# Patient Record
Sex: FEMALE | Race: WHITE
[De-identification: names, ages, dates, MRNs, and addresses within clinical notes are randomized per-mention and may not be internally consistent; named-entity substitution may affect disease eponyms.]

---

## 2020-02-25 NOTE — HP
*** AMENDED REPORT NOW INCLUDES DESIGNATED COSIGNER - ESIGNED BEFORE 
ADJUSTMENTS ***



HISTORY AND PHYSICAL:

 

DATE OF ADMISSION/SURGERY:  03/03/20

 

DATE OF OFFICE VISIT:  02/24/20

 

SURGEON:  Claudia Simon MD * (COLT BURNETTE)

 

PROCEDURE:  Left total knee arthroplasty.

 

CHIEF COMPLAINT:  Left knee pain.

 

HISTORY OF PRESENT ILLNESS:  Ms. Greenfield is a 69-year-old female with endstage 
osteoarthritis of the left knee.  She has failed conservative treatment and 
elected to proceed with the left total knee arthroplasty.

 

PAST MEDICAL HISTORY:  History of breast cancer.

 

PAST SURGICAL HISTORY:  Lumpectomy bilaterally, cholecystectomy, bladder 
suspension, urethral sling and hysterectomy.

 

MEDICATIONS:  No medications.

 

ALLERGIES:  CODEINE.

 

FAMILY HISTORY:  Coronary artery disease, cancer, diabetes and stroke.

 

SOCIAL HISTORY:  She is a 69-year-old female.  She lives with her spouse.  She 
does not smoke.

 

REVIEW OF SYSTEMS:  A complete 14-point review of systems was reviewed with the 
patient and was all negative or noncontributory.  She denies history of DVT, PE
, hepatitis, HIV or anesthesia problems.

 

                               PHYSICAL EXAMINATION

 

GENERAL:  She is well-developed, well-nourished, in no acute distress.

 

VITAL SIGNS:  She weighs 163 pounds, blood pressure 138/62, heart rate 90.

 

HEENT:  Normocephalic, atraumatic.

 

NECK:  Supple.  No palpable lymph nodes.

 

PULMONARY:  Lungs are clear to auscultation bilaterally.

 

CARDIO:  Regular rate and rhythm.  Strong S1 and S2.

 

ABDOMEN:  Soft, nontender, nondistended.

 

NEUROLOGIC:  She is alert and oriented x3.

 

MUSCULOSKELETAL:  Left lower extremity, skin is intact.  There are no open 
wounds or abrasions.  There is varus deformity at the left knee with a moderate 
effusion. Range of motion is 10 to 125 degrees with flexion with patellofemoral 
crepitus. She has some tenderness along the medial joint line.  She is able to 
dorsiflex and plantarflex.  2+ dorsalis pedis pulses, intact sensation.

 

 ASSESSMENT AND PLAN:  Ms. Greenfield is a 69-year-old female with end stage 
osteoarthritis of the left knee.  She has failed conservative treatment and 
elected to proceed with left total knee arthroplasty.  Surgery is scheduled for 
03/03/20 with Dr. Simon.  Dr. Simon discussed the risks and benefits of the 
surgery at today's visit and all of her questions were answered.  She will 
follow up with Dr. Simon 2 weeks after the surgery.

 

 ____________________________________ COLT KNOTT

 

[id number]/[facility] #: [id number]

ROLA

## 2020-03-03 ENCOUNTER — HOSPITAL ENCOUNTER (OUTPATIENT)
Dept: HOSPITAL 25 - OR | Age: 70
Setting detail: OBSERVATION
LOS: 1 days | Discharge: HOME | End: 2020-03-04
Attending: ORTHOPAEDIC SURGERY | Admitting: PHYSICIAN ASSISTANT
Payer: MEDICARE

## 2020-03-03 DIAGNOSIS — Z88.5: ICD-10-CM

## 2020-03-03 DIAGNOSIS — Z82.49: ICD-10-CM

## 2020-03-03 DIAGNOSIS — Z85.3: ICD-10-CM

## 2020-03-03 DIAGNOSIS — I10: ICD-10-CM

## 2020-03-03 DIAGNOSIS — Z90.13: ICD-10-CM

## 2020-03-03 DIAGNOSIS — M17.12: Primary | ICD-10-CM

## 2020-03-03 DIAGNOSIS — Z83.3: ICD-10-CM

## 2020-03-03 PROCEDURE — 85049 AUTOMATED PLATELET COUNT: CPT

## 2020-03-03 PROCEDURE — 96366 THER/PROPH/DIAG IV INF ADDON: CPT

## 2020-03-03 PROCEDURE — 86900 BLOOD TYPING SEROLOGIC ABO: CPT

## 2020-03-03 PROCEDURE — 85018 HEMOGLOBIN: CPT

## 2020-03-03 PROCEDURE — 96365 THER/PROPH/DIAG IV INF INIT: CPT

## 2020-03-03 PROCEDURE — 96361 HYDRATE IV INFUSION ADD-ON: CPT

## 2020-03-03 PROCEDURE — 80048 BASIC METABOLIC PNL TOTAL CA: CPT

## 2020-03-03 PROCEDURE — G0378 HOSPITAL OBSERVATION PER HR: HCPCS

## 2020-03-03 PROCEDURE — 96376 TX/PRO/DX INJ SAME DRUG ADON: CPT

## 2020-03-03 PROCEDURE — 86901 BLOOD TYPING SEROLOGIC RH(D): CPT

## 2020-03-03 PROCEDURE — 36415 COLL VENOUS BLD VENIPUNCTURE: CPT

## 2020-03-03 PROCEDURE — 85014 HEMATOCRIT: CPT

## 2020-03-03 PROCEDURE — 86850 RBC ANTIBODY SCREEN: CPT

## 2020-03-03 PROCEDURE — 96375 TX/PRO/DX INJ NEW DRUG ADDON: CPT

## 2020-03-03 RX ADMIN — SODIUM CHLORIDE, SODIUM LACTATE, POTASSIUM CHLORIDE, AND CALCIUM CHLORIDE SCH MLS/HR: 600; 310; 30; 20 INJECTION, SOLUTION INTRAVENOUS at 21:44

## 2020-03-03 RX ADMIN — FENTANYL CITRATE PRN MCG: 0.05 INJECTION, SOLUTION INTRAMUSCULAR; INTRAVENOUS at 18:55

## 2020-03-03 RX ADMIN — FENTANYL CITRATE PRN MCG: 0.05 INJECTION, SOLUTION INTRAMUSCULAR; INTRAVENOUS at 19:59

## 2020-03-03 RX ADMIN — ACETAMINOPHEN SCH: 325 TABLET ORAL at 21:44

## 2020-03-03 RX ADMIN — OXYCODONE HYDROCHLORIDE AND ACETAMINOPHEN PRN TAB: 5; 325 TABLET ORAL at 21:43

## 2020-03-03 RX ADMIN — DOCUSATE SODIUM SCH MG: 100 CAPSULE, LIQUID FILLED ORAL at 21:42

## 2020-03-03 RX ADMIN — CEFAZOLIN SCH MLS/HR: 1 INJECTION, POWDER, FOR SOLUTION INTRAVENOUS at 22:46

## 2020-03-03 RX ADMIN — MAGNESIUM HYDROXIDE SCH ML: 400 SUSPENSION ORAL at 21:42

## 2020-03-03 NOTE — CONS
CC:  Dr. Claudia Simon; Lakshmi Hartman NP *

 

HOSPITAL MEDICINE CONSULTATION:

 

DATE OF CONSULT:  20

 

REQUESTING PROVIDER ON CONSULT:  Dr. Claudia Simon.

 

PRIMARY CARE PHYSICIAN:  Lakshmi Hartman NP.

 

ATTENDING PHYSICIAN:  Dr. Rosaline Germain.

 

REASON FOR CONSULT:  Postoperative hypertension.

 

HISTORY OF PRESENT ILLNESS:  Ms. Greenfield is a 69-year-old female with past 
medical history of breast cancer, status post lumpectomy and radiation, who 
presents to [facility] today for an elective left total knee arthroplasty.  The 
patient had surgery late today and in the postoperative period was noted to be 
hypertensive with readings up into the 180s/100s. The patient notes that blood 
pressure recently has been around 130/70 and this is very consistent for her.  
She does note that a few days prior to surgery, she did have an elevated blood 
pressure reading with systolic in the 180s, though this is the first time that 
she has seen an elevated reading.  Of note, the patient did have an abnormal 
urinalysis on 20, and was prescribed a 7-day course of Cipro which she 
has completed at this point.

 

The patient is seen in PACU remaining in the bed.  At this time she offers no 
complaints.  She denies any chest pain, shortness of breath, dizziness or 
headache. She denies any actual pain, but notes that she has some sensation of 
mild discomfort in her left knee.

 

PAST MEDICAL HISTORY:  

1.  Bilateral breast cancer, status post lumpectomy and radiation.

 

PAST MEDICAL HISTORY:

1.  Bilateral lumpectomy.

2.  Cholecystectomy.

3.  Urethral sling and hysterectomy.

4.  Bladder suspension.

 

HOME MEDICATIONS:  None.

 

ALLERGIES:  CODEINE.

 

FAMILY HISTORY:  The patient reports her father had a history of heart disease 
and CVA, though he ultimately  of brain cancer.  She reports a brother with 
significant heart disease, recently undergoing a bypass surgery.  He also has 
hypertension and diabetes.

 

SOCIAL HISTORY:  She lives at home with her .  She notes she typically 
is quite active and tries to maintain a healthy diet.  Denies any tobacco, 
alcohol or recreational drug use.  In 2019, she was drinking alcoholic drinks 
daily though has not had any alcohol since 20.

 

The patient's , Nicolas, will be her surrogate decision maker in the event 
she is unable to make her own decisions.

 

REVIEW OF SYSTEMS:  An 11-point review of systems was performed and all the 
pertinent positive and negative findings are in the HPI.  All other systems are 
negative.

 

PHYSICAL EXAM:  General:  Ms. Greenfield is a well-developed, well-nourished, 
middle- aged white female, lying in bed, in no acute distress.  She appears her 
stated age. Vital Signs:  Temp 98.1, heart rate 84, respiratory rate 20, oxygen 
saturation 98% on room air, blood pressure 165/104.  HEENT:  Head is atraumatic
, normocephalic. Visual fields are grossly intact.  Pupils are equal, round, 
and reactive to light and accommodation.  Respiratory:  Symmetrical chest 
expansion.  Lungs:  Clear to auscultation throughout.  No rhonchi, wheezes or 
rales.  Cardiovascular:  Regular rate and rhythm.  S1, S2 present.  No murmurs, 
rubs or gallops.  No JVD. Extremities:  Skin warm and smooth bilaterally.  No 
edema.  No clubbing or cyanosis.  Abdomen:  Soft, nontender to palpation.  
Bowel sounds normoactive. Neuro:  Awake, alert, and oriented x4.  Speech is 
clear and fluent.  Moves all extremities.  Skin:  There is a surgical dressing 
intact to the left knee.

 

DIAGNOSTIC STUDIES/LAB DATA:  No pertinent data.

 

ASSESSMENT AND PLAN:  Ms. Greenfield is a 69-year-old female with past medical 
history of breast cancer, who presents today for an elective left total knee 
arthroplasty and was noted to be hypertensive in the postoperative setting.  
The patient is being admitted by Ortho, and Hospital Medicine will consult for:

 

1.  Status post left total knee arthroplasty.  Management per Ortho.

2.  Hypertension.  The patient is significantly hypertensive in PACU, but does 
not have any history of hypertension.  I suspect that this is related to 
anxiety and that this is simply a stress response.  The patient has had normal 
blood pressures very recently.  She is asymptomatic at this time.  I have 
ordered hydralazine p.r.n. for systolic pressures over 170.  First dose will be 
given by the PACU nurse now.  At this point, I will avoid putting her on any 
oral antihypertensives as I do not anticipate that she will need anything going 
forward.  We will continue to monitor her blood pressure and make any changes 
if necessary.

2.  History of breast cancer:  In remission.

3.  DVT prophylaxis:  Eliquis per Ortho.

4.  Code status:  Full code.

 

____________________________________ 

AMELIA ELLIOTT, NP

 

[id number]/[facility] #: [id number]

[facility] Division[facility]IS ALBERTO

## 2020-03-03 NOTE — OP
Operative Report - Blank





- Operative Report


Date of Operation: 03/03/20


Note: 





EMELY FRYE


1950





Date of Surgery: 3/3/20





Claudia Simon MD





Assistant: MAXIMILIANO GREGORY did help throughout the procedure with preparation of 

the knee, wound retraction, manipulation of the knee, and wound closure.  





Anesthesiologist: Dr. Chaudhry





Anesthesia Type: Spinal





Preoperative Diagnosis: Left severe degenerative osteoarthritis of the knee





Postoperative Diagnosis: As above





Procedure Performed: Left Total Knee Arthroplasty





Tourniquet time: 44 minutes





Complications: None





Specimen: Bone and cartilage from the left knee joint sent to pathology.  





Hardware Used: Cemented Smith and Nephew total knee hardware was used - For the 

femur a size 4 left narrow legion posterior stabilized femoral component, for 

the tibia a size 3 left mohit II tibial baseplate, for the insert a size 11 

mm 3-4 posterior stabilized articular polyethylene insert, and for the patella 

a size 29 3-peg all poly patella.  





Brief History/Indication: EMELY FRYE was known in clinic and had a history 

of severe left knee pain and swelling. She failed conservative treatment with 

anti-inflammatories, pain pills, intra-articular injections and physical 

therapy.  She elected to undergo left total knee arthroplasty due to continued 

pain and decreased quality of life.  Radiographs showed severe end stage 

osteoarthritis of the knee with bone on bone contact.  Informed consent was 

obtained from the patient.  She understood the risks of surgery included but 

were not limited to: bleeding, infection, damage to nearby structures, 

intraoperative fracture, nerve palsy, failure of the hardware, early loosening, 

knee stiffness or loss of motion, anesthesia complications, stroke, heart attack

, blood clot and death.  She wished to proceed.





Intra-Operative Findings: Intraoperatively the patient was noted to have severe 

loss of cartilage in all 3 compartments of the knee.  





Description of the Procedure: 





EMELY FRYE was identified in the preanesthesia unit. Her left knee was 

marked as the correct operative side.  Informed consent was signed and placed 

in the chart. The patient was taken to the operating room and placed under 

anesthesia without complication. A gutierrez catheter was placed. A tourniquet was 

placed on the left thigh. The left lower extremity was prepped and draped in 

the usual sterile fashion. Preoperative time-out was made to correctly identify 

the patient, side and site. Appropriate intraoperative antibiotics were given 

within one hour of incision.





Tourniquet was inflated. A midline incision was made and carried sharply down 

to the extensor mechanism. A new 10 blade was used to make a standard medial 

parapatellar arthrotomy. The patella was subluxed laterally. Electrocautery was 

used to dissect soft tissue off the superomedial tibia to the midsagittal 

plane.  The knee was flexed up. The anterior horn of the lateral meniscus and 

the ACL were sharply incised. A drill was used to enter the distal femur. The 

intramedullary distal femoral cutting guide was pinned on the distal femur. The 

oscillating saw was used to make the distal femoral cut.





The external rotation guide was pinned on the distal femur and the distal femur 

was sized to a size 4. The size 4 multi-cutting jig was pinned on the distal 

femur. The oscillating saw was used to make the appropriate 4 chamfer cuts.  

Next the PCL was completely released.  The extramedullary tibial cutting guide 

was pinned on the proximal tibia and the oscillating saw was used to make the 

proximal tibial cut perpendicular to the mechanical axis of the tibia. The bone 

was carefully removed.





The knee was brought out into full extension. The spacer block was placed and 

had excellent fit with the knee in full extension. The medial and lateral 

ligaments were well balanced. The flexion and extension gaps were well 

balanced. The knee was flexed up. Lamina  was placed both medially and 

laterally. Any remaining meniscus was removed with electrocautery.  Curved 

osteotome was used to remove any posterior osteophytes. The tibial tray and 

drop kelly were placed and confirmed a satisfactory tibial cut.





The size 4 left narrow femoral trial was impacted onto the distal femur. This 

trial had excellent fit and stability. The box for the posterior stabilized 

implant was prepared using a box cut osteotome and a reamer. Next a tibial tray 

trial and 9 mm insert trial was placed. The knee was taken through a range of 

motion and had full extension to 130 degrees of flexion. Patellofemoral 

tracking was satisfactory.





The patella was inverted and sized to a size 29. Three peg holes were drilled 

through the size 29 drill guide. The trial patella was placed and the knee was 

taken through a range of motion. There was satisfactory patellofemoral 

tracking. All trials were removed. The tibia was subluxed anteriorly and sized 

to a size 3. The proximal tibial was prepared with a size 3 keel punch.  All 

bony cut surfaces were irrigated with sterile saline and dried. Final implants 

were cemented into place starting with the tibia, followed by the femur, and 

last the patella. A 11 mm insert trial was placed and the knee was brought into 

full extension.





Tourniquet was turned down and the knee was copiously irrigated with sterile 

saline. Electrocautery was used to obtain meticulous hemostasis. Once the 

cement had fully cured, the insert trial was removed. Any excess cement was 

removed from around the hardware and capsule.  Final insert chosen was a 11 mm 

posterior stabilized Mohit II articular insert size 3-4. Stability of the 

insert was checked and noted to be stable.





The extensor mechanism was closed using number 1 vicryls. The rest of the 

incision was closed in a layered fashion using 0 and 2-0 vicryls. The skin was 

closed using 3-0 nylon suture. Sterile xeroform, 4x4s and webril were used to 

cover the incision.  Ace wrap and cold pack were used to cover the dressings. 

The patients anesthesia was reversed without difficulty. She was taken to the 

PACU in stable condition.  Intended weight-bearing will be as tolerated.

## 2020-03-04 VITALS — DIASTOLIC BLOOD PRESSURE: 53 MMHG | SYSTOLIC BLOOD PRESSURE: 128 MMHG

## 2020-03-04 LAB
ANION GAP SERPL CALC-SCNC: 6 MMOL/L (ref 2–11)
BUN SERPL-MCNC: 14 MG/DL (ref 6–24)
BUN/CREAT SERPL: 25.9 (ref 8–20)
CALCIUM SERPL-MCNC: 8.3 MG/DL (ref 8.6–10.3)
CHLORIDE SERPL-SCNC: 105 MMOL/L (ref 101–111)
GLUCOSE SERPL-MCNC: 139 MG/DL (ref 70–100)
HCO3 SERPL-SCNC: 27 MMOL/L (ref 22–32)
HCT VFR BLD AUTO: 38 % (ref 35–47)
HGB BLD-MCNC: 13.2 G/DL (ref 12–16)
PLATELET # BLD AUTO: 246 10^3/UL (ref 150–450)
POTASSIUM SERPL-SCNC: 3.7 MMOL/L (ref 3.5–5)
SODIUM SERPL-SCNC: 138 MMOL/L (ref 135–145)

## 2020-03-04 RX ADMIN — ACETAMINOPHEN SCH: 325 TABLET ORAL at 12:24

## 2020-03-04 RX ADMIN — OXYCODONE HYDROCHLORIDE AND ACETAMINOPHEN PRN TAB: 5; 325 TABLET ORAL at 09:27

## 2020-03-04 RX ADMIN — CEFAZOLIN SCH MLS/HR: 1 INJECTION, POWDER, FOR SOLUTION INTRAVENOUS at 06:08

## 2020-03-04 RX ADMIN — OXYCODONE HYDROCHLORIDE AND ACETAMINOPHEN PRN TAB: 5; 325 TABLET ORAL at 13:39

## 2020-03-04 RX ADMIN — SODIUM CHLORIDE, SODIUM LACTATE, POTASSIUM CHLORIDE, AND CALCIUM CHLORIDE SCH MLS/HR: 600; 310; 30; 20 INJECTION, SOLUTION INTRAVENOUS at 06:17

## 2020-03-04 RX ADMIN — DOCUSATE SODIUM SCH MG: 100 CAPSULE, LIQUID FILLED ORAL at 09:28

## 2020-03-04 RX ADMIN — MAGNESIUM HYDROXIDE SCH ML: 400 SUSPENSION ORAL at 09:28

## 2020-03-04 RX ADMIN — OXYCODONE HYDROCHLORIDE AND ACETAMINOPHEN PRN TAB: 5; 325 TABLET ORAL at 05:12

## 2020-03-04 RX ADMIN — ACETAMINOPHEN SCH: 325 TABLET ORAL at 05:50

## 2020-03-04 RX ADMIN — CEFAZOLIN SCH MLS/HR: 1 INJECTION, POWDER, FOR SOLUTION INTRAVENOUS at 13:01

## 2020-03-04 NOTE — PN
Progress Note





- Progress Note


Date of Service: 03/04/20


Note: 





Pt seen at bedside POD 1 SP LTK. She felt well without complaints and desires 

DC home. Denies CP, SOB, dizziness, nausea. Appears well, dressing changed 

incision CDI, df/pf intact, dp2+ distally. Will DC home today if PT goals met.

## 2020-03-04 NOTE — DS
Orthopedic Discharge Summary





- Discharge Summary





Date of Admission:20


Date of Discharge: 3/4/20


Date of Surgery: 3/3/20


Attending Orthopedic Provider: Dr Simon


Pre-operative Diagnosis: Left knee osteoarthritis


Operative Procedure: left total knee replacement


Disposition of Patient:home


Home care vs Outpatient services: homecare 


Condition of Patient: stable


Pain medication RX at discharge: Percocet 5/325 mg 1-2 q 4 hr MDD 10


DVT prophylaxis RX at discharge: eliquis 2.5 mg bid x 30 days psot op 


History: EMELY FRYE is a 69 year old F with years of increasingly severe 

left knee pain. Patient has failed conservative management and has elected to 

undergo a left total knee replacement


Hospital Course: EMELY was admitted to [facility] on 20. 

Patient underwent a left total knee replacement without complication followed 

by a brief recovery in PACU and transfer to the Short Stay Surgical Unit in 

stable condition. Our hospitalist service, physical therapy and occupational 

therapy also participated in this patients care. Post-op day 1: patient was 

alert and in no acute distress. Dressing was clean, dry and intact. Operative 

extremity dorsiflexion and plantarflexion intact, sensation intact to light 

touch distally, DP2+. Prior to discharge: dressing was changed, incision was 

clean, dry and intact. Patient was deemed to be medically and orthopedically 

stable for discharge. Physical therapy goals were met.





 Home Medications











 Medication  Instructions  Recorded  Confirmed  Type


 


Acetaminophen TAB* [Tylenol TAB*] 975 mg PO Q8HR  tab 20  Rx


 


Apixaban* [Eliquis*] 2.5 mg PO BID #60 tab 20  Rx


 


Docusate CAP* [Colace Cap*] 100 mg PO BID PRN #50 cap 20  Rx


 


oxyCODONE/Acetamin 5/325 MG* 2 tab PO Q4H PRN #60 tab MDD 10 20  Rx





[Percocet 5/325 TAB*]    











Discharge Instructions following Orthopedic Surgery:





Activity:  


* Weight Bearing as tolerated


* Continue physical therapy and occupational therapy exercises as shown


* you have elected to have home physical therapy, continue therapy exercises at 

home. 








Wound care: 


* OK to shower on post-op day 3, no bathing, swimming, or submerging wound. 


* Use gentle soap, pat dry. Cover with gauze, ACE wrap or tape.


* you elected to have a visiting home nurse, they will perform wound checks.





Call Orthopedic office for: 


* Increased drainage


* Redness


* Increased pain


* Fever 





***Go to ER with shortness of breath or chest pain.***





Diet: 


* Regular diet


* Increase fluids and fiber to prevent constipation. 


* Continue to use stool softeners, call office if no bowel motion within 48 

hours.








Medications


See Home Medication List in your packet for medications that you should take 

after discharge.





DVT Prophylaxis:





   Eliquis Dosin.5 mg, 1 tab every 12 hours x 30 days.  Increases bleeding 

tendency











Pain Control:








         Percocet 5/325 mg 1 tab for moderate pain and 2 tabs for severe pain 

by mouth every 4 hours as needed. Maximum of 10 tabs per day. Hold for sedation

, wean off as soon as pain allows





**Please note that Percocet contains Tylenol (acetaminophen). Maximum daily 

dose of Tylenol is 4000 mg from all sources.**





Antibiotics are required prior to any dental work.








FOLLOW UP: Follow up with Dr. Luke] Within [14] days, call for appointment





Please call our office with any questions or concerns ([phone])





[facility]